# Patient Record
Sex: FEMALE | Race: WHITE | Employment: OTHER | URBAN - METROPOLITAN AREA
[De-identification: names, ages, dates, MRNs, and addresses within clinical notes are randomized per-mention and may not be internally consistent; named-entity substitution may affect disease eponyms.]

---

## 2024-04-19 ENCOUNTER — OFFICE VISIT (OUTPATIENT)
Age: 79
End: 2024-04-19

## 2024-04-19 VITALS
HEART RATE: 67 BPM | WEIGHT: 171 LBS | SYSTOLIC BLOOD PRESSURE: 118 MMHG | BODY MASS INDEX: 36.89 KG/M2 | DIASTOLIC BLOOD PRESSURE: 68 MMHG | HEIGHT: 57 IN

## 2024-04-19 DIAGNOSIS — I25.119 CORONARY ARTERY DISEASE INVOLVING NATIVE CORONARY ARTERY OF NATIVE HEART WITH ANGINA PECTORIS (HCC): ICD-10-CM

## 2024-04-19 DIAGNOSIS — I50.20 SYSTOLIC HEART FAILURE, UNSPECIFIED HF CHRONICITY (HCC): ICD-10-CM

## 2024-04-19 DIAGNOSIS — Z76.89 ENCOUNTER TO ESTABLISH CARE: Primary | ICD-10-CM

## 2024-04-19 DIAGNOSIS — I48.0 PAROXYSMAL A-FIB (HCC): ICD-10-CM

## 2024-04-19 PROCEDURE — 1123F ACP DISCUSS/DSCN MKR DOCD: CPT | Performed by: INTERNAL MEDICINE

## 2024-04-19 PROCEDURE — 93000 ELECTROCARDIOGRAM COMPLETE: CPT | Performed by: INTERNAL MEDICINE

## 2024-04-19 PROCEDURE — 99204 OFFICE O/P NEW MOD 45 MIN: CPT | Performed by: INTERNAL MEDICINE

## 2024-04-19 RX ORDER — ATORVASTATIN CALCIUM 40 MG/1
40 TABLET, FILM COATED ORAL NIGHTLY
COMMUNITY
Start: 2023-08-18

## 2024-04-19 RX ORDER — DIGOXIN 125 MCG
125 TABLET ORAL DAILY
COMMUNITY
Start: 2023-08-18

## 2024-04-19 RX ORDER — LOSARTAN POTASSIUM 50 MG/1
50 TABLET ORAL DAILY
COMMUNITY
Start: 2023-08-18

## 2024-04-19 RX ORDER — LEVETIRACETAM 500 MG/1
500 TABLET ORAL
COMMUNITY
Start: 2023-08-18

## 2024-04-19 RX ORDER — ALENDRONATE SODIUM 70 MG/1
70 TABLET ORAL
COMMUNITY
Start: 2023-08-18

## 2024-04-19 RX ORDER — SPIRONOLACTONE 25 MG/1
25 TABLET ORAL DAILY
COMMUNITY
Start: 2023-08-18

## 2024-04-19 RX ORDER — CYCLOBENZAPRINE HCL 10 MG
1 TABLET ORAL NIGHTLY
COMMUNITY
Start: 2023-07-11

## 2024-04-19 RX ORDER — PANTOPRAZOLE SODIUM 40 MG/1
40 TABLET, DELAYED RELEASE ORAL DAILY
COMMUNITY
Start: 2023-08-18

## 2024-04-19 ASSESSMENT — ENCOUNTER SYMPTOMS
EYE PAIN: 0
GASTROINTESTINAL NEGATIVE: 1
EYES NEGATIVE: 1
CHEST TIGHTNESS: 0
SHORTNESS OF BREATH: 1
ABDOMINAL PAIN: 0
ALLERGIC/IMMUNOLOGIC NEGATIVE: 1
BACK PAIN: 0
PHOTOPHOBIA: 0

## 2024-04-19 NOTE — PROGRESS NOTES
[address] [facility], [place] [address]  PHONE: [phone]    Julieta Simmons  1945    SUBJECTIVE:   Julieta Simmons is a 78 y.o. female seen for initial evaluation of:      Chief Complaint   Patient presents with    New Patient    Congestive Heart Failure        Cardiac Hx (Reviewed and summarized by me):  Referred for heart failure and suspected AMI but no records were sent and we have no clinical notes.  Formerly followed by [facility] cardiology  Has a pacemaker  1) CHF  2) Afib - on elquis for stroke prevention  3) PPM - medtronic placed 8/15/22  4) CAD    2014 and 2021 - two separate stents placed   Stents placed in [place] multiple, no records (Done at [facility] in [facility]   5) HLD    HPI:  78-year-old female with a long cardiac history presents for dilation she is complaining today of fatigue.  She has a history of coronary artery disease we have no records.  She reports having had 3 heart attacks and 2 stents placed.  At some point she was told she needed another stent and she refused.  She has a history of heart failure atrial fibrillation she has had strokes and is on blood thinner because of the afib.  She has a card showing Medtronic is a manufacture pacemaker is placed in 2022.  She does not speak English.    ECG: undetermined rhythm but no p waves, irbbb, ns twave changes    Past Medical History, Past Surgical History, Family history, Social History, and Medications were all reviewed with the patient today and updated as necessary.       Current Outpatient Medications:     alendronate (FOSAMAX) 70 MG tablet, Take 1 tablet by mouth, Disp: , Rfl:     apixaban (ELIQUIS) 5 MG TABS tablet, Take 1 tablet by mouth, Disp: , Rfl:     atorvastatin (LIPITOR) 40 MG tablet, Take 1 tablet by mouth nightly, Disp: , Rfl:     cyclobenzaprine (FLEXERIL) 10 MG tablet, Take 1 tablet by mouth nightly, Disp: , Rfl:     digoxin (LANOXIN) 125 MCG tablet, Take

## 2024-07-02 LAB
ESTIMATED AVERAGE GLUCOSE: ABNORMAL
HBA1C MFR BLD: 6.9 %